# Patient Record
Sex: FEMALE | Race: WHITE | NOT HISPANIC OR LATINO | Employment: STUDENT | URBAN - METROPOLITAN AREA
[De-identification: names, ages, dates, MRNs, and addresses within clinical notes are randomized per-mention and may not be internally consistent; named-entity substitution may affect disease eponyms.]

---

## 2017-09-07 ENCOUNTER — TRANSFERRED RECORDS (OUTPATIENT)
Dept: HEALTH INFORMATION MANAGEMENT | Facility: CLINIC | Age: 22
End: 2017-09-07

## 2018-12-17 ENCOUNTER — AMBULATORY - HEALTHEAST (OUTPATIENT)
Dept: MULTI SPECIALTY CLINIC | Facility: CLINIC | Age: 23
End: 2018-12-17

## 2018-12-17 LAB — PAP SMEAR - HIM PATIENT REPORTED: NORMAL

## 2019-03-22 ENCOUNTER — COMMUNICATION - HEALTHEAST (OUTPATIENT)
Dept: TELEHEALTH | Facility: CLINIC | Age: 24
End: 2019-03-22

## 2019-03-22 ENCOUNTER — OFFICE VISIT - HEALTHEAST (OUTPATIENT)
Dept: FAMILY MEDICINE | Facility: CLINIC | Age: 24
End: 2019-03-22

## 2019-03-22 DIAGNOSIS — Z11.3 ROUTINE SCREENING FOR STI (SEXUALLY TRANSMITTED INFECTION): ICD-10-CM

## 2019-03-22 DIAGNOSIS — A59.9 TRICHOMONAS INFECTION: ICD-10-CM

## 2019-03-22 LAB
CLUE CELLS: ABNORMAL
TRICHOMONAS, WET PREP: ABNORMAL
YEAST, WET PREP: ABNORMAL

## 2019-03-22 ASSESSMENT — MIFFLIN-ST. JEOR: SCORE: 1556.43

## 2019-03-26 ENCOUNTER — COMMUNICATION - HEALTHEAST (OUTPATIENT)
Dept: FAMILY MEDICINE | Facility: CLINIC | Age: 24
End: 2019-03-26

## 2019-05-02 ENCOUNTER — OFFICE VISIT - HEALTHEAST (OUTPATIENT)
Dept: FAMILY MEDICINE | Facility: CLINIC | Age: 24
End: 2019-05-02

## 2019-05-02 DIAGNOSIS — Z11.3 ROUTINE SCREENING FOR STI (SEXUALLY TRANSMITTED INFECTION): ICD-10-CM

## 2019-05-02 DIAGNOSIS — Z86.19 HISTORY OF TRICHOMONAL VAGINITIS: ICD-10-CM

## 2019-05-02 DIAGNOSIS — L70.0 ACNE VULGARIS: ICD-10-CM

## 2019-05-02 DIAGNOSIS — N89.8 VAGINAL DISCHARGE: ICD-10-CM

## 2019-05-02 LAB
CLUE CELLS: NORMAL
TRICHOMONAS, WET PREP: NORMAL
YEAST, WET PREP: NORMAL

## 2019-05-02 ASSESSMENT — MIFFLIN-ST. JEOR: SCORE: 1553.59

## 2019-05-03 LAB
C TRACH DNA SPEC QL PROBE+SIG AMP: NEGATIVE
N GONORRHOEA DNA SPEC QL NAA+PROBE: NEGATIVE

## 2019-07-30 ENCOUNTER — COMMUNICATION - HEALTHEAST (OUTPATIENT)
Dept: FAMILY MEDICINE | Facility: CLINIC | Age: 24
End: 2019-07-30

## 2019-07-30 ENCOUNTER — OFFICE VISIT - HEALTHEAST (OUTPATIENT)
Dept: FAMILY MEDICINE | Facility: CLINIC | Age: 24
End: 2019-07-30

## 2019-07-30 DIAGNOSIS — Z86.19 HISTORY OF TRICHOMONAL VAGINITIS: ICD-10-CM

## 2019-07-30 DIAGNOSIS — Z80.3 FAMILY HISTORY OF MALIGNANT NEOPLASM OF BREAST: ICD-10-CM

## 2019-07-30 DIAGNOSIS — L70.0 ACNE VULGARIS: ICD-10-CM

## 2019-07-30 LAB
CLUE CELLS: NORMAL
TRICHOMONAS, WET PREP: NORMAL
YEAST, WET PREP: NORMAL

## 2019-07-30 ASSESSMENT — MIFFLIN-ST. JEOR: SCORE: 1593

## 2019-12-05 ENCOUNTER — OFFICE VISIT - HEALTHEAST (OUTPATIENT)
Dept: FAMILY MEDICINE | Facility: CLINIC | Age: 24
End: 2019-12-05

## 2019-12-05 DIAGNOSIS — J02.9 SORE THROAT: ICD-10-CM

## 2019-12-05 LAB — DEPRECATED S PYO AG THROAT QL EIA: NORMAL

## 2019-12-05 ASSESSMENT — MIFFLIN-ST. JEOR: SCORE: 1593.28

## 2019-12-06 LAB — GROUP A STREP BY PCR: NORMAL

## 2019-12-16 ENCOUNTER — COMMUNICATION - HEALTHEAST (OUTPATIENT)
Dept: FAMILY MEDICINE | Facility: CLINIC | Age: 24
End: 2019-12-16

## 2019-12-16 DIAGNOSIS — R05.9 COUGH: ICD-10-CM

## 2020-05-18 ENCOUNTER — COMMUNICATION - HEALTHEAST (OUTPATIENT)
Dept: FAMILY MEDICINE | Facility: CLINIC | Age: 25
End: 2020-05-18

## 2020-05-18 DIAGNOSIS — R05.9 COUGH: ICD-10-CM

## 2021-05-26 NOTE — PROGRESS NOTES
"[facility] Clinic Note    Giulia Hickey  1995   [id number]    Giulia Hickey is a 24 y.o. female presenting to discuss the following:     CC:   Chief Complaint   Patient presents with     Establish Care     possible vaginal infection.        HPI:  Having frequent vaginal discharge. States it is watery. Notices a smell, bathing more often due to odor. Discharge is not chunky. Is loose. Off and on relationship with boyfriend. Denies concern for STI, was tested with IUD insertion.      ROS:   : No urinary symptoms.   GYN: periods are regular.  Remaining 14 point ROS negative.      PMH:   Patient Active Problem List   Diagnosis     IUD (intrauterine device) in place     Past Medical History:   Diagnosis Date     Innocent heart murmur 10/18/2010    Overview:  Normal ECHO ; no SBE       PSH:   No past surgical history on file.    MEDICATIONS:   Current Outpatient Medications on File Prior to Visit   Medication Sig Dispense Refill     copper (PARAGARD) 380 square mm IUD IUD 1 each by Intrauterine route once.       No current facility-administered medications on file prior to visit.        ALLERGIES:  No Known Allergies    FAMHx:  Family History   Problem Relation Age of Onset     Breast cancer Mother      Atrial fibrillation Father      Hypertension Father      No Medical Problems Sister      No Medical Problems Brother      Ovarian cancer Maternal Grandmother      SOCIAL HISTORY:   Social History     Socioeconomic History     Marital status: Single   Tobacco Use     Smoking status: Former Smoker     Types: Cigarettes     Last attempt to quit: 2018     Years since quittin.5     Smokeless tobacco: Never Used   Substance and Sexual Activity     Alcohol use: Yes     Drug use: No     Sexual activity: Yes     Partners: Male     Birth control/protection: IUD       PHYSICAL EXAM:   /68   Pulse 72   Temp 98  F (36.7  C)   Resp 16   Ht 5' 3\" (1.6 m)   Wt 189 lb (85.7 kg)   LMP 2019   " BMI 33.48 kg/m     GENERAL: Giulia is a pleasant, well appearing female, in no acute distress.   HEENT: Sclera white, no nasal discharge.   HEART: Regular rate and rhythm, I do not appreciate murmur today.   LUNGS: Clear to auscultation bilaterally, unlabored.   ABDOMEN: Soft, non-tender to palpation  GYN: No external genital lesions. Increased volume of watery discharge, no clumps, no odor.   MSK: Normal gait  NEURO: no focal deficits  PSYCH: Mood is good, normal affect, appropriately groomed    LABS:   Recent Results (from the past 240 hour(s))   Wet Prep, Vaginal   Result Value Ref Range    Yeast Result Yeast Seen (!) No yeast seen    Trichomonas Trichomonas seen (!) No Trichomonas seen    Clue Cells, Wet Prep No Clue cells seen No Clue cells seen      ASSESSMENT & PLAN:   Giulia Hickey is a 24 y.o. female presenting today for evaluation of vaginal discharge.    1. Trichomonas infection  2. Routine screening for STI (sexually transmitted infection)  - Wet Prep, Vaginal  - metroNIDAZOLE (FLAGYL) 500 MG tablet; Take 1 tablet (500 mg total) by mouth 2 (two) times a day for 7 days.  Dispense: 14 tablet; Refill: 0  - Chlamydia trachomatis & Neisseria gonorrhoeae, Amplified Detection; Future    Wet prep reveals trichomonas. Will treat with Flagyl. Recommended she notify any recent sexual partners since last testing to notify of positive diagnosis as they should also be treated.     Given positive trichomonas, recommended screening for gonorrhea and chlamydia as well. Will collect self swab sample and drop off on Monday.     Wet prep was also positive for yeast, but not consistent with history and findings on exam. If symptoms persist after completion of Flagyl, then will send in treatment for Diflucan.     RTC: 1 year - annual physical exam     Linette Kingsley DO

## 2021-05-27 NOTE — TELEPHONE ENCOUNTER
----- Message from Linette Kingsley DO sent at 3/26/2019 12:47 PM CDT -----  Please check on Giulia.  Are her discharge symptoms improving? If still bothersome, her wet prep also showed some yeast, so I can send in a dose of Diflucan. I waited to treat as symptoms seemed more consistent with other infection we are treating.  Thanks,  Linette    ----- Message -----  From: Linette Kingsley DO  Sent: 3/24/2019   7:01 PM  To: Linette Kingsley, DO    Check on discharge symptoms, treat with Diflucan?

## 2021-05-27 NOTE — TELEPHONE ENCOUNTER
Patient Returning Call  Reason for call:  Patient called back and asked her if her syptoms were approving she said yes.  Information relayed to patient:  Asked questions from message below from Sancho.  Patient has additional questions:  No  If YES, what are your questions/concerns:  n/a  Okay to leave a detailed message?: No call back needed

## 2021-05-27 NOTE — TELEPHONE ENCOUNTER
Left message for pt to call back.  Please ask Dr. Kingsley's questions below regarding vaginal discharge and document pt's response.

## 2021-05-28 NOTE — PROGRESS NOTES
Discussed negative wet prep in clinic. Updated patient on negative gonorhea/chlamydia screen by MyChart.  Linette Kingsley, DO

## 2021-05-28 NOTE — PROGRESS NOTES
"[facility] Clinic Note    Giulia Hickey  1995   [id number]    Giulia Hickey is a 24 y.o. female presenting to discuss the following:     CC:   Chief Complaint   Patient presents with     Follow-up       HPI:  Giulia presents today for recheck STI screening after previous diagnosis of trichomonas.  Still feels like she has some changes with her discharge.  Also would like to do STI screening daily for gonorrhea and chlamydia.    Additionally, would like to talk about treating adult acne.  Predominantly along the jawline.  Develops hard painful cysts.    ROS:   See HPI above.     PMH:   Patient Active Problem List   Diagnosis     IUD (intrauterine device) in place       Past Medical History:   Diagnosis Date     Innocent heart murmur 10/18/2010    Overview:  Normal ECHO 12/09; no SBE       PSH:   No past surgical history on file.      MEDICATIONS:   Current Outpatient Medications on File Prior to Visit   Medication Sig Dispense Refill     copper (PARAGARD) 380 square mm IUD IUD 1 each by Intrauterine route once.       No current facility-administered medications on file prior to visit.        ALLERGIES:  No Known Allergies      PHYSICAL EXAM:   /70   Pulse 88   Temp 98.1  F (36.7  C) (Oral)   Resp 16   Ht 5' 3\" (1.6 m)   Wt 188 lb 6 oz (85.4 kg)   BMI 33.37 kg/m     GENERAL: Giulia is a pleasant, well appearing female in no acute distress.   GYN: physiologic discharge, IUD strings in place,no genital lesions  DERM: several cystic acne lesions along jaw line    LABS:   Recent Results (from the past 24 hour(s))   Wet Prep, Vaginal   Result Value Ref Range    Yeast Result No yeast seen No yeast seen    Trichomonas No Trichomonas seen No Trichomonas seen    Clue Cells, Wet Prep No Clue cells seen No Clue cells seen      ASSESSMENT & PLAN:   Giulia Hickey is a 24 y.o. female presenting today for follow up STI screening and to discuss acne management.    1. Routine screening for STI " (sexually transmitted infection)  2. History of trichomonal vaginitis  3. Vaginal discharge  - Chlamydia trachomatis & Neisseria gonorrhoeae, Amplified Detection  - Wet Prep, Vaginal    Wet prep normal.  Awaiting gonorrhea and chlamydia screening.  Will contact patient by my chart with lab results.    4. Acne vulgaris  - spironolactone (ALDACTONE) 50 MG tablet; Take 1 tablet (50 mg total) by mouth daily.  Dispense: 30 tablet; Refill: 11  - tretinoin (RETIN-A) 0.1 % cream; Apply topically at bedtime.  Dispense: 45 g; Refill: 3     Giulia has failed topical acne medications.  Currently has copper IUD, so we will not prescribe additional hormonal agents at this time.  However, this would be an option if we do not get acne under control.  Discussed starting with either spironolactone, topical tretinoin, and/or oral antibiotics.  She previously did not tolerate doxycycline well due to nausea, and would like to start with spironolactone and topical tretinoin.  We will follow-up in 1 month to reassess acne, consider titration of spironolactone and/or initiation of antibiotics.    Additionally recommended a bland cleanser and moisturizer such as Vanicream or Cetaphil.    RTC: 1 month - follow up acne, check potassium level    Linette Kingsley DO

## 2021-05-30 NOTE — PROGRESS NOTES
"[facility] Clinic Note    Name: Giulia Hickey  : 1995   MRN: [id number]    Giulia Hickey is a 24 y.o. female presenting to discuss the following:     CC:   Chief Complaint   Patient presents with     Follow-up     STI     Medication Education Visit       HPI:  Didn't tolerate spironolactone, caused urinary frequency, had to stop several times to go to the bathroom. Felt more flushed and warm.     Wants to recheck for trichomonas. Is no longer with previous partner. Wants to make sure no recurrence. Symptoms are similar to previous visit where repeat trichomonas was negative but is worried.     ROS:   See HPI above.     PMH:   Patient Active Problem List   Diagnosis     IUD (intrauterine device) in place       Past Medical History:   Diagnosis Date     Innocent heart murmur 10/18/2010    Overview:  Normal ECHO ; no SBE       PSH:   No past surgical history on file.      MEDICATIONS:   Current Outpatient Medications on File Prior to Visit   Medication Sig Dispense Refill     copper (PARAGARD) 380 square mm IUD IUD 1 each by Intrauterine route once.       spironolactone (ALDACTONE) 50 MG tablet Take 1 tablet (50 mg total) by mouth daily. 30 tablet 11     tretinoin (RETIN-A) 0.1 % cream Apply topically at bedtime. 45 g 3     No current facility-administered medications on file prior to visit.        ALLERGIES:  No Known Allergies    PHYSICAL EXAM:   /78   Pulse 100   Temp 98.2  F (36.8  C) (Oral)   Resp 16   Ht 5' 3\" (1.6 m)   Wt 197 lb 1 oz (89.4 kg)   BMI 34.91 kg/m     GENERAL: Giulia is a pleasant, well appearing female in no acute distress.   HEART: Regular rate and rhythm, no murmurs.   LUNGS: Clear to auscultation bilaterally, unlabored.   GYN: Physiologic discharge, no external genital lesions.   DERM: Cystic acne along jaw line.     LABS:   Recent Results (from the past 24 hour(s))   Wet Prep, Vaginal   Result Value Ref Range    Yeast Result No yeast seen No yeast seen    " Trichomonas No Trichomonas seen No Trichomonas seen    Clue Cells, Wet Prep No Clue cells seen No Clue cells seen        ASSESSMENT & PLAN:   Giulia Hickey is a 24 y.o. female presenting for adjustment of acne treatment and retest for trichomonas.    1. Acne vulgaris  - doxycycline (VIBRAMYCIN) 100 MG capsule; Take 1 capsule (100 mg total) by mouth 2 (two) times a day.  Dispense: 60 capsule; Refill: 2    Didn't tolerate spironolactone, would like to try antibiotics instead. Continue Retin-A. Will trial doxycycline two times a day for 3 months. Take with food and full glass of water, but no calcium containing foods/supplements.     Reassess in 3 months, sooner if not tolerating.     2. History of trichomonal vaginitis  - Wet Prep, Vaginal    Repeat wet prep today to confirm clearance of previous infection and not reinfected. Wet prep normal today.     3. Family history of malignant neoplasm of breast  Maternal history of breast cancer in her mother, diagnosed in her 60's, thinks she was tested for genetic predisposition and was positive, however she was not very close to her mother and isn't confident on this. Sister and Aunt both think she was positive for genetic mutation, unable to obtain records.     Giulia is not yet old enough to calculate 5 year and lifelong estimated risk of breast cancer, however if we calculate with presumed age 34 yo, her risk is 0.6% in 5 years (compared to 0.3% average risk) and lifetime risk 19.2% (compared to average risk 12.6%). Based on projected lifetime risk less than 20%, recommend routine screening recommendations.       Sent patient [employer] message after clinic visit today to consider referral to genetic counselor to further discuss risks vs benefits of screening for BRCA in setting of not sure if mother was actually positive or not. Waiting to hear on patient's preferences for referral or not.     RTC: 3 months - follow up acne    Linette Kingsley,

## 2021-06-02 VITALS — HEIGHT: 63 IN | WEIGHT: 189 LBS | BODY MASS INDEX: 33.49 KG/M2

## 2021-06-02 VITALS — BODY MASS INDEX: 33.38 KG/M2 | HEIGHT: 63 IN | WEIGHT: 188.38 LBS

## 2021-06-03 VITALS — BODY MASS INDEX: 34.91 KG/M2 | WEIGHT: 197.06 LBS | HEIGHT: 63 IN

## 2021-06-03 VITALS
BODY MASS INDEX: 34.93 KG/M2 | TEMPERATURE: 97.8 F | RESPIRATION RATE: 16 BRPM | SYSTOLIC BLOOD PRESSURE: 114 MMHG | HEIGHT: 63 IN | DIASTOLIC BLOOD PRESSURE: 78 MMHG | HEART RATE: 76 BPM | WEIGHT: 197.13 LBS

## 2021-06-04 NOTE — PROGRESS NOTES
"[facility] Clinic Note    Name: Giulia Hickey  : 1995   MRN: [id number]    Giulia Hickey is a 24 y.o. female presenting to discuss the following:     CC:   Chief Complaint   Patient presents with     Sore Throat     HPI:  Extremely sore throat, started yesterday with rhinorrhea, mild cough, cold symptoms. Woke up this morning with severe sore throat. Hurts to eat, hurts to swallow, worried about strep.     Using mucinex, helped for a little bit. Has  chloriseptic spray but .   No tylenol or ibuprofen yet.    ROS:   CONSTITUTIONAL: No fevers or chills.   HEENT: No ear pain  HEART: no chest pain  LUNGS: no shortness of breath  ABDOMEN: no nausea, no vomiting, no abdominal pain  DERM: no rashes    PMH:   Patient Active Problem List   Diagnosis     IUD (intrauterine device) in place     Acne vulgaris     Family history of malignant neoplasm of breast       Past Medical History:   Diagnosis Date     Innocent heart murmur 10/18/2010    Overview:  Normal ECHO ; no SBE     PSH:   No past surgical history on file.    MEDICATIONS:   Current Outpatient Medications on File Prior to Visit   Medication Sig Dispense Refill     copper (PARAGARD) 380 square mm IUD IUD 1 each by Intrauterine route once.       doxycycline (VIBRAMYCIN) 100 MG capsule Take 1 capsule (100 mg total) by mouth 2 (two) times a day. 60 capsule 2     tretinoin (RETIN-A) 0.1 % cream Apply topically at bedtime. 45 g 3     No current facility-administered medications on file prior to visit.      ALLERGIES:  No Known Allergies    PHYSICAL EXAM:   /78   Pulse 76   Temp 97.8  F (36.6  C) (Oral)   Resp 16   Ht 5' 3\" (1.6 m)   Wt 197 lb 2 oz (89.4 kg)   BMI 34.92 kg/m     GENERAL: Giulia is a pleasant, obese female, in no acute distress.   HEENT: Sclera white, no nasal discharge, tympanic membranes are normal bilaterally, tonsils mildly erythematous without exudates, no cervical lymphadenopathy.   HEART: Regular " rate and rhythm, no murmurs.  LUNGS: Clear to auscultation bilaterally, unlabored.     LABS:   Recent Results (from the past 24 hour(s))   Rapid Strep A Screen-Throat   Result Value Ref Range    Rapid Strep A Antigen No Group A Strep detected, presumptive negative No Group A Strep detected, presumptive negative        ASSESSMENT & PLAN:   Giulia Hickey is a 24 y.o. female presenting today for evaluation of sore throat.    1. Sore throat  - Rapid Strep A Screen-Throat  - Group A Strep, RNA Direct Detection, Throat     Rapid strep test was negative.  Given symptoms of rhinorrhea and cough, suspect more of a viral process.  However, we will culture out specimen and contact patient initiate antibiotics if strep test is positive.    Recommended continuing symptomatic management.  Scheduled Tylenol and ibuprofen for management of sore throat.  Recommended managing nasal congestion and postnasal drainage with a decongestant.  Anticipate symptoms will slowly improve over the next 7 to 10 days.    RTC: 1 year - annual physical exam    Linette Kingsley DO

## 2021-06-04 NOTE — PATIENT INSTRUCTIONS - HE
Be sure to check active ingredients in over the counter products as many use multiple active ingredients to help with your symptoms. Be sure not to take too much of a single active ingredient.     Nasal Congestion and/or Eustachian Tube Dysfunction:   Choose 1 of the following decongestants:     pseudoephedrine (Sudafed)    Phenylephrine     Consider adding the following:     nasal steroid spray (Flonase)    non-sedating antihistamine (Claritin/Zyrtec/Allegra)    nasal saline spray     Sore Throat:    Ibuprofen as needed - pain and inflammation    Tylenol as needed - pain    Throat lozenges - soothing    Tea with honey - soothing    Frequent sips of water - soothing    Cough:     Guaifenesin - drink plenty of water, thins secretions    Dextromethorphan - cough suppressant    Cough Drops - soothes throat

## 2021-06-08 NOTE — TELEPHONE ENCOUNTER
Refill Approved    Rx renewed per Medication Renewal Policy. Medication was last renewed on 12/16/19.    Shanna Montenegro, [facility] Triage/Med Refill 5/20/2020     Requested Prescriptions   Pending Prescriptions Disp Refills     albuterol (PROAIR HFA;PROVENTIL HFA;VENTOLIN HFA) 90 mcg/actuation inhaler 1 each 0     Sig: Inhale 2 puffs every 6 (six) hours as needed for wheezing.       Albuterol/Levalbuterol Refill Protocol Passed - 5/18/2020 10:31 PM        Passed - PCP or prescribing provider visit in last year     Last office visit with prescriber/PCP: 12/5/2019 Linette Kingsley DO OR same dept: 12/5/2019 Linette Kingsley DO OR same specialty: 12/5/2019 Linette Kingsley DO Last physical: Visit date not found       Next appt within 3 mo: Visit date not found  Next physical within 3 mo: Visit date not found  Prescriber OR PCP: Linette Kingsley DO  Last diagnosis associated with med order: 1. Cough  - albuterol (PROAIR HFA;PROVENTIL HFA;VENTOLIN HFA) 90 mcg/actuation inhaler; Inhale 2 puffs every 6 (six) hours as needed for wheezing.  Dispense: 1 each; Refill: 0    If protocol passes may refill for 6 months if within 3 months of last provider visit (or a total of 9 months). If patient requesting >1 inhaler per month refill x 6 months and have patient make appointment with provider.

## 2021-07-25 ENCOUNTER — HEALTH MAINTENANCE LETTER (OUTPATIENT)
Age: 26
End: 2021-07-25

## 2021-09-19 ENCOUNTER — HEALTH MAINTENANCE LETTER (OUTPATIENT)
Age: 26
End: 2021-09-19

## 2022-08-21 ENCOUNTER — HEALTH MAINTENANCE LETTER (OUTPATIENT)
Age: 27
End: 2022-08-21

## 2022-11-21 ENCOUNTER — HEALTH MAINTENANCE LETTER (OUTPATIENT)
Age: 27
End: 2022-11-21

## 2023-09-17 ENCOUNTER — HEALTH MAINTENANCE LETTER (OUTPATIENT)
Age: 28
End: 2023-09-17